# Patient Record
Sex: MALE | Race: WHITE
[De-identification: names, ages, dates, MRNs, and addresses within clinical notes are randomized per-mention and may not be internally consistent; named-entity substitution may affect disease eponyms.]

---

## 2018-04-04 ENCOUNTER — HOSPITAL ENCOUNTER (OUTPATIENT)
Dept: HOSPITAL 95 - LAB | Age: 68
End: 2018-04-04
Attending: NURSE PRACTITIONER
Payer: MEDICARE

## 2018-04-04 DIAGNOSIS — Z12.11: Primary | ICD-10-CM

## 2018-09-20 ENCOUNTER — HOSPITAL ENCOUNTER (OUTPATIENT)
Dept: HOSPITAL 95 - ORSCSDS | Age: 68
Discharge: HOME | End: 2018-09-20
Attending: SURGERY
Payer: MEDICARE

## 2018-09-20 VITALS — HEIGHT: 64.02 IN | WEIGHT: 147.25 LBS | BODY MASS INDEX: 25.14 KG/M2

## 2018-09-20 DIAGNOSIS — I10: ICD-10-CM

## 2018-09-20 DIAGNOSIS — Z12.11: Primary | ICD-10-CM

## 2018-09-20 DIAGNOSIS — Z86.010: ICD-10-CM

## 2018-09-20 DIAGNOSIS — Z79.899: ICD-10-CM

## 2018-09-20 DIAGNOSIS — Z85.46: ICD-10-CM

## 2018-09-20 DIAGNOSIS — Z85.850: ICD-10-CM

## 2018-09-20 DIAGNOSIS — D12.2: ICD-10-CM

## 2018-09-20 DIAGNOSIS — D12.5: ICD-10-CM

## 2018-09-20 PROCEDURE — 0DBK8ZX EXCISION OF ASCENDING COLON, VIA NATURAL OR ARTIFICIAL OPENING ENDOSCOPIC, DIAGNOSTIC: ICD-10-PCS | Performed by: SURGERY

## 2018-09-20 PROCEDURE — 3E0H8GC INTRODUCTION OF OTHER THERAPEUTIC SUBSTANCE INTO LOWER GI, VIA NATURAL OR ARTIFICIAL OPENING ENDOSCOPIC: ICD-10-PCS | Performed by: SURGERY

## 2018-09-20 PROCEDURE — 0DBN8ZX EXCISION OF SIGMOID COLON, VIA NATURAL OR ARTIFICIAL OPENING ENDOSCOPIC, DIAGNOSTIC: ICD-10-PCS | Performed by: SURGERY

## 2019-01-10 ENCOUNTER — HOSPITAL ENCOUNTER (OUTPATIENT)
Dept: HOSPITAL 95 - ORSCSDS | Age: 69
Discharge: HOME | End: 2019-01-10
Attending: SURGERY
Payer: MEDICARE

## 2019-01-10 VITALS — HEIGHT: 64.02 IN | WEIGHT: 153.99 LBS | BODY MASS INDEX: 26.29 KG/M2

## 2019-01-10 DIAGNOSIS — Z79.899: ICD-10-CM

## 2019-01-10 DIAGNOSIS — E03.9: ICD-10-CM

## 2019-01-10 DIAGNOSIS — Z86.010: Primary | ICD-10-CM

## 2019-01-10 DIAGNOSIS — F32.9: ICD-10-CM

## 2019-01-10 DIAGNOSIS — I10: ICD-10-CM

## 2019-01-10 PROCEDURE — 0DJD8ZZ INSPECTION OF LOWER INTESTINAL TRACT, VIA NATURAL OR ARTIFICIAL OPENING ENDOSCOPIC: ICD-10-PCS | Performed by: SURGERY

## 2020-09-02 NOTE — NUR
09/02/[address] AWARE OF BP AND STATES HE BELIEVES THE BP WILL GO DOWN ONCE
PT GOES HOME AND TAKES HIS REG MEDS. BP IS IMPROVED FROM PREOP

## 2020-09-02 NOTE — NUR
09/02/20 1420 Marcella Mckeon
LATE ENTRY: PT BP ELEVATED. DR. SAMAYOA AWARE. DR. IBARRA CONSULTED FOR
ASSESSMENT. PT ALSO GETTING AGITATED DUE TO HEADACHE AND PAIN. PT
STATES HE HASN'T TAKEN HIS METHADONE TODAY AND IS IN HIGH PAIN. PT
DE-ESCALATED AFTER CONVERSING WITH DR. SAMAYOA AND DR. IBARRA. LOCAL
INJECTION WAS ADMINISTERED IN PRE-OP BY DR. SAMAYOA. REPORT GIVEN TO ANGELIKA WOODARD AND PT WAS WHEELED TO OR.